# Patient Record
Sex: FEMALE | Race: WHITE | NOT HISPANIC OR LATINO
[De-identification: names, ages, dates, MRNs, and addresses within clinical notes are randomized per-mention and may not be internally consistent; named-entity substitution may affect disease eponyms.]

---

## 2017-08-03 ENCOUNTER — APPOINTMENT (OUTPATIENT)
Dept: INTERNAL MEDICINE | Facility: CLINIC | Age: 27
End: 2017-08-03
Payer: COMMERCIAL

## 2017-08-03 ENCOUNTER — LABORATORY RESULT (OUTPATIENT)
Age: 27
End: 2017-08-03

## 2017-08-03 VITALS
TEMPERATURE: 98.6 F | DIASTOLIC BLOOD PRESSURE: 70 MMHG | HEART RATE: 85 BPM | WEIGHT: 187 LBS | OXYGEN SATURATION: 98 % | HEIGHT: 67 IN | SYSTOLIC BLOOD PRESSURE: 100 MMHG | BODY MASS INDEX: 29.35 KG/M2

## 2017-08-03 DIAGNOSIS — Z87.891 PERSONAL HISTORY OF NICOTINE DEPENDENCE: ICD-10-CM

## 2017-08-03 DIAGNOSIS — Z86.19 PERSONAL HISTORY OF OTHER INFECTIOUS AND PARASITIC DISEASES: ICD-10-CM

## 2017-08-03 DIAGNOSIS — Z80.3 FAMILY HISTORY OF MALIGNANT NEOPLASM OF BREAST: ICD-10-CM

## 2017-08-03 DIAGNOSIS — N30.00 ACUTE CYSTITIS W/OUT HEMATURIA: ICD-10-CM

## 2017-08-03 DIAGNOSIS — Z82.49 FAMILY HISTORY OF ISCHEMIC HEART DISEASE AND OTHER DISEASES OF THE CIRCULATORY SYSTEM: ICD-10-CM

## 2017-08-03 DIAGNOSIS — R22.9 LOCALIZED SWELLING, MASS AND LUMP, UNSPECIFIED: ICD-10-CM

## 2017-08-03 DIAGNOSIS — Z80.0 FAMILY HISTORY OF MALIGNANT NEOPLASM OF DIGESTIVE ORGANS: ICD-10-CM

## 2017-08-03 PROCEDURE — 90471 IMMUNIZATION ADMIN: CPT

## 2017-08-03 PROCEDURE — 90715 TDAP VACCINE 7 YRS/> IM: CPT

## 2017-08-03 PROCEDURE — 36415 COLL VENOUS BLD VENIPUNCTURE: CPT

## 2017-08-03 PROCEDURE — 99385 PREV VISIT NEW AGE 18-39: CPT | Mod: 25

## 2017-08-04 ENCOUNTER — TRANSCRIPTION ENCOUNTER (OUTPATIENT)
Age: 27
End: 2017-08-04

## 2017-09-08 ENCOUNTER — TRANSCRIPTION ENCOUNTER (OUTPATIENT)
Age: 27
End: 2017-09-08

## 2017-09-08 LAB
25(OH)D3 SERPL-MCNC: 34.4 NG/ML
ALBUMIN SERPL ELPH-MCNC: 4.3 G/DL
ALP BLD-CCNC: 64 U/L
ALT SERPL-CCNC: 19 U/L
ANION GAP SERPL CALC-SCNC: 13 MMOL/L
APPEARANCE: CLEAR
AST SERPL-CCNC: 25 U/L
BASOPHILS # BLD AUTO: 0.02 K/UL
BASOPHILS NFR BLD AUTO: 0.3 %
BILIRUB SERPL-MCNC: 0.5 MG/DL
BILIRUBIN URINE: NEGATIVE
BLOOD URINE: NEGATIVE
BUN SERPL-MCNC: 10 MG/DL
CALCIUM SERPL-MCNC: 9.5 MG/DL
CHLORIDE SERPL-SCNC: 100 MMOL/L
CHOLEST SERPL-MCNC: 160 MG/DL
CHOLEST/HDLC SERPL: 2.3 RATIO
CO2 SERPL-SCNC: 24 MMOL/L
COLOR: ABNORMAL
CREAT SERPL-MCNC: 0.71 MG/DL
EOSINOPHIL # BLD AUTO: 0.2 K/UL
EOSINOPHIL NFR BLD AUTO: 2.7 %
GLUCOSE QUALITATIVE U: NORMAL MG/DL
GLUCOSE SERPL-MCNC: 77 MG/DL
HBA1C MFR BLD HPLC: 4.9 %
HCT VFR BLD CALC: 39.2 %
HDLC SERPL-MCNC: 70 MG/DL
HGB BLD-MCNC: 12.7 G/DL
IMM GRANULOCYTES NFR BLD AUTO: 0.1 %
KETONES URINE: ABNORMAL
LDLC SERPL CALC-MCNC: 72 MG/DL
LEUKOCYTE ESTERASE URINE: NEGATIVE
LYMPHOCYTES # BLD AUTO: 2.27 K/UL
LYMPHOCYTES NFR BLD AUTO: 30.4 %
MAN DIFF?: NORMAL
MCHC RBC-ENTMCNC: 29.7 PG
MCHC RBC-ENTMCNC: 32.4 GM/DL
MCV RBC AUTO: 91.6 FL
MONOCYTES # BLD AUTO: 0.46 K/UL
MONOCYTES NFR BLD AUTO: 6.2 %
NEUTROPHILS # BLD AUTO: 4.5 K/UL
NEUTROPHILS NFR BLD AUTO: 60.3 %
NITRITE URINE: NEGATIVE
PH URINE: 7
PLATELET # BLD AUTO: 286 K/UL
POTASSIUM SERPL-SCNC: 4.6 MMOL/L
PROT SERPL-MCNC: 7.8 G/DL
PROTEIN URINE: NEGATIVE MG/DL
RBC # BLD: 4.28 M/UL
RBC # FLD: 13.8 %
SODIUM SERPL-SCNC: 137 MMOL/L
SPECIFIC GRAVITY URINE: 1.03
TRIGL SERPL-MCNC: 88 MG/DL
TSH SERPL-ACNC: 1.46 UIU/ML
UROBILINOGEN URINE: NORMAL MG/DL
WBC # FLD AUTO: 7.46 K/UL

## 2017-09-11 ENCOUNTER — FORM ENCOUNTER (OUTPATIENT)
Age: 27
End: 2017-09-11

## 2017-09-12 ENCOUNTER — APPOINTMENT (OUTPATIENT)
Dept: ULTRASOUND IMAGING | Facility: CLINIC | Age: 27
End: 2017-09-12
Payer: COMMERCIAL

## 2017-09-12 ENCOUNTER — OUTPATIENT (OUTPATIENT)
Dept: OUTPATIENT SERVICES | Facility: HOSPITAL | Age: 27
LOS: 1 days | End: 2017-09-12

## 2017-09-12 PROCEDURE — 76882 US LMTD JT/FCL EVL NVASC XTR: CPT | Mod: 26,LT

## 2017-10-10 ENCOUNTER — TRANSCRIPTION ENCOUNTER (OUTPATIENT)
Age: 27
End: 2017-10-10

## 2017-10-11 ENCOUNTER — TRANSCRIPTION ENCOUNTER (OUTPATIENT)
Age: 27
End: 2017-10-11

## 2017-10-23 ENCOUNTER — APPOINTMENT (OUTPATIENT)
Dept: INTERNAL MEDICINE | Facility: CLINIC | Age: 27
End: 2017-10-23
Payer: COMMERCIAL

## 2017-10-23 VITALS
DIASTOLIC BLOOD PRESSURE: 70 MMHG | HEIGHT: 67 IN | BODY MASS INDEX: 29.51 KG/M2 | HEART RATE: 90 BPM | WEIGHT: 188 LBS | SYSTOLIC BLOOD PRESSURE: 110 MMHG | OXYGEN SATURATION: 99 % | TEMPERATURE: 98.5 F

## 2017-10-23 DIAGNOSIS — Z87.09 PERSONAL HISTORY OF OTHER DISEASES OF THE RESPIRATORY SYSTEM: ICD-10-CM

## 2017-10-23 PROCEDURE — 87880 STREP A ASSAY W/OPTIC: CPT | Mod: QW

## 2017-10-23 PROCEDURE — 99213 OFFICE O/P EST LOW 20 MIN: CPT | Mod: 25

## 2017-10-23 RX ORDER — CIPROFLOXACIN HYDROCHLORIDE 500 MG/1
500 TABLET, FILM COATED ORAL
Qty: 14 | Refills: 0 | Status: COMPLETED | COMMUNITY
Start: 2017-06-26

## 2017-10-25 LAB — S PYO AG SPEC QL IA: NEGATIVE

## 2017-12-15 ENCOUNTER — APPOINTMENT (OUTPATIENT)
Dept: ULTRASOUND IMAGING | Facility: CLINIC | Age: 27
End: 2017-12-15

## 2018-01-02 ENCOUNTER — OTHER (OUTPATIENT)
Age: 28
End: 2018-01-02

## 2018-04-04 ENCOUNTER — FORM ENCOUNTER (OUTPATIENT)
Age: 28
End: 2018-04-04

## 2018-04-05 ENCOUNTER — OUTPATIENT (OUTPATIENT)
Dept: OUTPATIENT SERVICES | Facility: HOSPITAL | Age: 28
LOS: 1 days | End: 2018-04-05
Payer: COMMERCIAL

## 2018-04-05 ENCOUNTER — APPOINTMENT (OUTPATIENT)
Dept: INTERNAL MEDICINE | Facility: CLINIC | Age: 28
End: 2018-04-05
Payer: COMMERCIAL

## 2018-04-05 ENCOUNTER — APPOINTMENT (OUTPATIENT)
Dept: RADIOLOGY | Facility: CLINIC | Age: 28
End: 2018-04-05

## 2018-04-05 VITALS
HEART RATE: 64 BPM | BODY MASS INDEX: 29.51 KG/M2 | OXYGEN SATURATION: 99 % | WEIGHT: 188 LBS | TEMPERATURE: 98.2 F | HEIGHT: 67 IN | SYSTOLIC BLOOD PRESSURE: 118 MMHG | DIASTOLIC BLOOD PRESSURE: 70 MMHG

## 2018-04-05 DIAGNOSIS — M25.532 PAIN IN LEFT WRIST: ICD-10-CM

## 2018-04-05 DIAGNOSIS — H57.8 OTHER SPECIFIED DISORDERS OF EYE AND ADNEXA: ICD-10-CM

## 2018-04-05 PROCEDURE — 73110 X-RAY EXAM OF WRIST: CPT | Mod: 26,LT

## 2018-04-05 PROCEDURE — 99213 OFFICE O/P EST LOW 20 MIN: CPT

## 2018-04-05 RX ORDER — OSELTAMIVIR PHOSPHATE 75 MG/1
75 CAPSULE ORAL
Qty: 10 | Refills: 0 | Status: COMPLETED | COMMUNITY
Start: 2018-01-10 | End: 2018-04-05

## 2018-04-05 RX ORDER — LIDOCAINE HYDROCHLORIDE 20 MG/ML
2 SOLUTION OROPHARYNGEAL 4 TIMES DAILY
Qty: 1 | Refills: 0 | Status: COMPLETED | COMMUNITY
Start: 2017-10-23 | End: 2018-04-05

## 2018-06-11 ENCOUNTER — APPOINTMENT (OUTPATIENT)
Dept: OPHTHALMOLOGY | Facility: CLINIC | Age: 28
End: 2018-06-11
Payer: COMMERCIAL

## 2018-06-11 DIAGNOSIS — G43.109 MIGRAINE WITH AURA, NOT INTRACTABLE, W/OUT STATUS MIGRAINOSUS: ICD-10-CM

## 2018-06-11 DIAGNOSIS — H04.123 DRY EYE SYNDROME OF BILATERAL LACRIMAL GLANDS: ICD-10-CM

## 2018-06-11 PROCEDURE — 92002 INTRM OPH EXAM NEW PATIENT: CPT

## 2018-08-14 ENCOUNTER — APPOINTMENT (OUTPATIENT)
Dept: INTERNAL MEDICINE | Facility: CLINIC | Age: 28
End: 2018-08-14

## 2018-10-26 ENCOUNTER — APPOINTMENT (OUTPATIENT)
Dept: INTERNAL MEDICINE | Facility: CLINIC | Age: 28
End: 2018-10-26
Payer: COMMERCIAL

## 2018-10-26 ENCOUNTER — LABORATORY RESULT (OUTPATIENT)
Age: 28
End: 2018-10-26

## 2018-10-26 VITALS
HEART RATE: 53 BPM | OXYGEN SATURATION: 96 % | SYSTOLIC BLOOD PRESSURE: 119 MMHG | TEMPERATURE: 98.7 F | DIASTOLIC BLOOD PRESSURE: 85 MMHG | HEIGHT: 67 IN

## 2018-10-26 DIAGNOSIS — T78.1XXA OTHER ADVERSE FOOD REACTIONS, NOT ELSEWHERE CLASSIFIED, INITIAL ENCOUNTER: ICD-10-CM

## 2018-10-26 DIAGNOSIS — Z83.79 FAMILY HISTORY OF OTHER DISEASES OF THE DIGESTIVE SYSTEM: ICD-10-CM

## 2018-10-26 PROCEDURE — 99214 OFFICE O/P EST MOD 30 MIN: CPT | Mod: 25

## 2018-10-26 PROCEDURE — 36415 COLL VENOUS BLD VENIPUNCTURE: CPT

## 2018-10-26 PROCEDURE — 90686 IIV4 VACC NO PRSV 0.5 ML IM: CPT

## 2018-10-26 PROCEDURE — G0008: CPT

## 2018-11-04 LAB
BARLEY IGE QN: <0.1 KUA/L
CHERRY IGE QN: <0.1 KUA/L
COW MILK IGE QN: <0.1 KUA/L
CRAB IGE QN: <0.1 KUA/L
DEPRECATED BARLEY IGE RAST QL: 0
DEPRECATED CHERRY IGE RAST QL: 0
DEPRECATED COW MILK IGE RAST QL: 0
DEPRECATED CRAB IGE RAST QL: 0
DEPRECATED EGG WHITE IGE RAST QL: 0
DEPRECATED OAT IGE RAST QL: 0
DEPRECATED PEANUT IGE RAST QL: 0
DEPRECATED RYE IGE RAST QL: 0
DEPRECATED SOYBEAN IGE RAST QL: 0
DEPRECATED WHEAT IGE RAST QL: 0
EGG WHITE IGE QN: <0.1 KUA/L
ENDOMYSIUM IGA SER QL: NEGATIVE
ENDOMYSIUM IGA TITR SER: NORMAL
GLIADIN IGA SER QL: <5 UNITS
GLIADIN IGG SER QL: <5 UNITS
GLIADIN PEPTIDE IGA SER-ACNC: NEGATIVE
GLIADIN PEPTIDE IGG SER-ACNC: NEGATIVE
OAT IGE QN: <0.1 KUA/L
PEANUT IGE QN: <0.1 KUA/L
RYE IGE QN: <0.1 KUA/L
SOYBEAN IGE QN: <0.1 KUA/L
TOTAL IGE SMQN RAST: 13 KU/L
TTG IGA SER IA-ACNC: <5 UNITS
TTG IGA SER-ACNC: NEGATIVE
TTG IGG SER IA-ACNC: <5 UNITS
TTG IGG SER IA-ACNC: NEGATIVE
WHEAT IGE QN: <0.1 KUA/L

## 2019-01-03 ENCOUNTER — TRANSCRIPTION ENCOUNTER (OUTPATIENT)
Age: 29
End: 2019-01-03

## 2019-01-22 ENCOUNTER — APPOINTMENT (OUTPATIENT)
Dept: OBGYN | Facility: CLINIC | Age: 29
End: 2019-01-22
Payer: COMMERCIAL

## 2019-01-22 VITALS
BODY MASS INDEX: 30.62 KG/M2 | WEIGHT: 202 LBS | DIASTOLIC BLOOD PRESSURE: 80 MMHG | HEIGHT: 68 IN | SYSTOLIC BLOOD PRESSURE: 110 MMHG

## 2019-01-22 DIAGNOSIS — Z82.49 FAMILY HISTORY OF ISCHEMIC HEART DISEASE AND OTHER DISEASES OF THE CIRCULATORY SYSTEM: ICD-10-CM

## 2019-01-22 PROCEDURE — 99385 PREV VISIT NEW AGE 18-39: CPT

## 2019-01-22 NOTE — HISTORY OF PRESENT ILLNESS
[Definite:  ___ (Date)] : the last menstrual period was [unfilled] [Regular Cycle Intervals] : periods have been regular [Sexually Active] : is sexually active [Male ___] : [unfilled] male [Yes] : yes [Last Colonoscopy ___] : Last colonoscopy [unfilled] [Last Pap ___] : Last cervical pap smear was [unfilled] [Reproductive Age] : is of reproductive age [Menstrual Problems] : reports normal menses [Contraception] : uses contraception [Oral Contraceptive] : uses oral contraception pills [Menarche Age: ____] : age at menarche was [unfilled]

## 2019-01-24 LAB — HPV HIGH+LOW RISK DNA PNL CVX: NOT DETECTED

## 2019-01-28 LAB — CYTOLOGY CVX/VAG DOC THIN PREP: NORMAL

## 2019-02-20 ENCOUNTER — TRANSCRIPTION ENCOUNTER (OUTPATIENT)
Age: 29
End: 2019-02-20

## 2019-02-26 ENCOUNTER — TRANSCRIPTION ENCOUNTER (OUTPATIENT)
Age: 29
End: 2019-02-26

## 2019-03-03 ENCOUNTER — TRANSCRIPTION ENCOUNTER (OUTPATIENT)
Age: 29
End: 2019-03-03

## 2019-03-31 ENCOUNTER — TRANSCRIPTION ENCOUNTER (OUTPATIENT)
Age: 29
End: 2019-03-31

## 2019-04-12 ENCOUNTER — TRANSCRIPTION ENCOUNTER (OUTPATIENT)
Age: 29
End: 2019-04-12

## 2019-04-15 ENCOUNTER — APPOINTMENT (OUTPATIENT)
Dept: OBGYN | Facility: CLINIC | Age: 29
End: 2019-04-15
Payer: COMMERCIAL

## 2019-04-15 DIAGNOSIS — Z92.29 PERSONAL HISTORY OF OTHER DRUG THERAPY: ICD-10-CM

## 2019-04-15 DIAGNOSIS — Z23 ENCOUNTER FOR IMMUNIZATION: ICD-10-CM

## 2019-04-15 DIAGNOSIS — Z01.419 ENCOUNTER FOR GYNECOLOGICAL EXAMINATION (GENERAL) (ROUTINE) W/OUT ABNORMAL FINDINGS: ICD-10-CM

## 2019-04-15 PROCEDURE — 99213 OFFICE O/P EST LOW 20 MIN: CPT

## 2019-04-15 NOTE — HISTORY OF PRESENT ILLNESS
[Definite:  ___ (Date)] : the last menstrual period was [unfilled] [Frequency: Q ___ days] : menstrual periods occur approximately every [unfilled] days [Lower-Rt-Q] : lower right quadrant [Continuous] : continuous [Regular Cycle Intervals] : periods have been irregular

## 2019-04-15 NOTE — CHIEF COMPLAINT
[Follow Up] : follow up GYN visit [FreeTextEntry1] : Patient is here for a follow up visit in reference to lower abdominal pain on right side which started back in January. Patient also stated experiencing numbness in right leg.

## 2019-04-15 NOTE — PHYSICAL EXAM
[No Bleeding] : there was no active vaginal bleeding [Normal] : uterus [Tenderness] : nontender [Uterine Adnexae] : were not tender and not enlarged [FreeTextEntry7] : Pelvic tenderness, bilateral, but more on the right.

## 2019-04-22 ENCOUNTER — APPOINTMENT (OUTPATIENT)
Dept: ANTEPARTUM | Facility: CLINIC | Age: 29
End: 2019-04-22
Payer: COMMERCIAL

## 2019-04-22 PROCEDURE — 76830 TRANSVAGINAL US NON-OB: CPT

## 2019-04-22 PROCEDURE — 76856 US EXAM PELVIC COMPLETE: CPT

## 2019-05-20 ENCOUNTER — APPOINTMENT (OUTPATIENT)
Dept: OBGYN | Facility: CLINIC | Age: 29
End: 2019-05-20

## 2019-06-11 ENCOUNTER — APPOINTMENT (OUTPATIENT)
Dept: INTERNAL MEDICINE | Facility: CLINIC | Age: 29
End: 2019-06-11
Payer: COMMERCIAL

## 2019-06-11 VITALS
DIASTOLIC BLOOD PRESSURE: 84 MMHG | TEMPERATURE: 98.2 F | HEIGHT: 68 IN | WEIGHT: 202 LBS | BODY MASS INDEX: 30.62 KG/M2 | HEART RATE: 55 BPM | SYSTOLIC BLOOD PRESSURE: 121 MMHG | OXYGEN SATURATION: 99 %

## 2019-06-11 DIAGNOSIS — B00.1 HERPESVIRAL VESICULAR DERMATITIS: ICD-10-CM

## 2019-06-11 DIAGNOSIS — Z11.59 ENCOUNTER FOR SCREENING FOR OTHER VIRAL DISEASES: ICD-10-CM

## 2019-06-11 PROCEDURE — 99395 PREV VISIT EST AGE 18-39: CPT | Mod: 25

## 2019-06-11 PROCEDURE — 36415 COLL VENOUS BLD VENIPUNCTURE: CPT

## 2019-06-11 RX ORDER — AZELASTINE HYDROCHLORIDE 0.5 MG/ML
0.05 SOLUTION/ DROPS OPHTHALMIC TWICE DAILY
Qty: 1 | Refills: 0 | Status: COMPLETED | COMMUNITY
Start: 2018-04-05 | End: 2019-06-11

## 2019-06-13 NOTE — HEALTH RISK ASSESSMENT
[0] : 1) Little interest or pleasure doing things: Not at all (0) [FreeTextEntry1] : anxiety [[id number]] : 0

## 2019-06-13 NOTE — PHYSICAL EXAM
[No Acute Distress] : no acute distress [Well Nourished] : well nourished [Normal Sclera/Conjunctiva] : normal sclera/conjunctiva [No JVD] : no jugular venous distention [Supple] : supple [No Respiratory Distress] : no respiratory distress  [No Lymphadenopathy] : no lymphadenopathy [Clear to Auscultation] : lungs were clear to auscultation bilaterally [No Accessory Muscle Use] : no accessory muscle use [Normal Rate] : normal rate  [Normal S1, S2] : normal S1 and S2 [Regular Rhythm] : with a regular rhythm [No Murmur] : no murmur heard [No Edema] : there was no peripheral edema [Soft] : abdomen soft [No Extremity Clubbing/Cyanosis] : no extremity clubbing/cyanosis [Non-distended] : non-distended [Non Tender] : non-tender [No HSM] : no HSM [No Masses] : no abdominal mass palpated [Normal Posterior Cervical Nodes] : no posterior cervical lymphadenopathy [Normal Bowel Sounds] : normal bowel sounds [No CVA Tenderness] : no CVA  tenderness [Normal Anterior Cervical Nodes] : no anterior cervical lymphadenopathy [No Spinal Tenderness] : no spinal tenderness [No Joint Swelling] : no joint swelling [Grossly Normal Strength/Tone] : grossly normal strength/tone [No Rash] : no rash [Coordination Grossly Intact] : coordination grossly intact [Normal Gait] : normal gait [Normal Affect] : the affect was normal [No Focal Deficits] : no focal deficits [Alert and Oriented x3] : oriented to person, place, and time [Normal Insight/Judgement] : insight and judgment were intact

## 2019-07-23 LAB
25(OH)D3 SERPL-MCNC: 21.5 NG/ML
ALBUMIN SERPL ELPH-MCNC: 4.6 G/DL
ALP BLD-CCNC: 76 U/L
ALT SERPL-CCNC: 11 U/L
ANION GAP SERPL CALC-SCNC: 12 MMOL/L
AST SERPL-CCNC: 16 U/L
BASOPHILS # BLD AUTO: 0.04 K/UL
BASOPHILS NFR BLD AUTO: 0.7 %
BILIRUB SERPL-MCNC: 0.4 MG/DL
BUN SERPL-MCNC: 13 MG/DL
CALCIUM SERPL-MCNC: 9.5 MG/DL
CHLORIDE SERPL-SCNC: 101 MMOL/L
CHOLEST SERPL-MCNC: 164 MG/DL
CHOLEST/HDLC SERPL: 2.2 RATIO
CO2 SERPL-SCNC: 24 MMOL/L
CREAT SERPL-MCNC: 0.72 MG/DL
EOSINOPHIL # BLD AUTO: 0.2 K/UL
EOSINOPHIL NFR BLD AUTO: 3.3 %
ESTIMATED AVERAGE GLUCOSE: 91 MG/DL
GLUCOSE SERPL-MCNC: 90 MG/DL
HBA1C MFR BLD HPLC: 4.8 %
HCT VFR BLD CALC: 41.4 %
HDLC SERPL-MCNC: 75 MG/DL
HGB BLD-MCNC: 13.4 G/DL
HSV 1+2 IGG SER IA-IMP: NEGATIVE
HSV 1+2 IGG SER IA-IMP: POSITIVE
HSV1 IGG SER QL: 2.6 INDEX
HSV2 IGG SER QL: 0.12 INDEX
IMM GRANULOCYTES NFR BLD AUTO: 0.2 %
LDLC SERPL CALC-MCNC: 69 MG/DL
LYMPHOCYTES # BLD AUTO: 1.55 K/UL
LYMPHOCYTES NFR BLD AUTO: 25.3 %
MAN DIFF?: NORMAL
MCHC RBC-ENTMCNC: 30.7 PG
MCHC RBC-ENTMCNC: 32.4 GM/DL
MCV RBC AUTO: 95 FL
MEV IGG FLD QL IA: 176 AU/ML
MEV IGG+IGM SER-IMP: POSITIVE
MONOCYTES # BLD AUTO: 0.42 K/UL
MONOCYTES NFR BLD AUTO: 6.9 %
MUV AB SER-ACNC: POSITIVE
MUV IGG SER QL IA: 71.8 AU/ML
NEUTROPHILS # BLD AUTO: 3.91 K/UL
NEUTROPHILS NFR BLD AUTO: 63.6 %
PLATELET # BLD AUTO: 289 K/UL
POTASSIUM SERPL-SCNC: 4 MMOL/L
PROT SERPL-MCNC: 7.4 G/DL
RBC # BLD: 4.36 M/UL
RBC # FLD: 13.2 %
RUBV IGG FLD-ACNC: 1.6 INDEX
RUBV IGG SER-IMP: POSITIVE
SODIUM SERPL-SCNC: 137 MMOL/L
TRIGL SERPL-MCNC: 101 MG/DL
TSH SERPL-ACNC: 2.75 UIU/ML
VZV AB TITR SER: POSITIVE
VZV IGG SER IF-ACNC: 1765 INDEX
WBC # FLD AUTO: 6.13 K/UL

## 2019-09-10 ENCOUNTER — MEDICATION RENEWAL (OUTPATIENT)
Age: 29
End: 2019-09-10

## 2019-09-19 ENCOUNTER — TRANSCRIPTION ENCOUNTER (OUTPATIENT)
Age: 29
End: 2019-09-19

## 2019-09-23 ENCOUNTER — APPOINTMENT (OUTPATIENT)
Dept: INTERNAL MEDICINE | Facility: CLINIC | Age: 29
End: 2019-09-23
Payer: COMMERCIAL

## 2019-09-23 VITALS
TEMPERATURE: 98.2 F | SYSTOLIC BLOOD PRESSURE: 124 MMHG | HEART RATE: 63 BPM | HEIGHT: 68 IN | DIASTOLIC BLOOD PRESSURE: 84 MMHG | OXYGEN SATURATION: 97 %

## 2019-09-23 PROCEDURE — 99214 OFFICE O/P EST MOD 30 MIN: CPT

## 2019-09-25 NOTE — HISTORY OF PRESENT ILLNESS
[FreeTextEntry8] : Last 6 months: worsening heartburn.  Sometimes feels like bites of food are getting stuck even though not bigger than usual.  Sometimes feels like food stuck, only relief is if regurgitates food, once a month for last 5 months.  Taking Tums or Rolaid 2-3x/week.  When younger (10 yrs ago) had purging as part of eating disorder.  Knows triggers, tries to avoid, but diet changed because stopped being vegetarian in the last 6 months.  \par \par Anxiety better with lexapro, since that is better she is now able to tell that this is a different sensation that she had previously attributed to anxiety.

## 2019-09-25 NOTE — REVIEW OF SYSTEMS
[Chest Pain] : chest pain [Heartburn] : heartburn [Negative] : ENT [FreeTextEntry7] : sometimes feels like food getting caught

## 2019-11-12 ENCOUNTER — TRANSCRIPTION ENCOUNTER (OUTPATIENT)
Age: 29
End: 2019-11-12

## 2019-11-25 ENCOUNTER — MEDICATION RENEWAL (OUTPATIENT)
Age: 29
End: 2019-11-25

## 2019-11-25 ENCOUNTER — TRANSCRIPTION ENCOUNTER (OUTPATIENT)
Age: 29
End: 2019-11-25

## 2019-12-10 ENCOUNTER — TRANSCRIPTION ENCOUNTER (OUTPATIENT)
Age: 29
End: 2019-12-10

## 2019-12-27 ENCOUNTER — TRANSCRIPTION ENCOUNTER (OUTPATIENT)
Age: 29
End: 2019-12-27

## 2019-12-27 ENCOUNTER — APPOINTMENT (OUTPATIENT)
Dept: GASTROENTEROLOGY | Facility: CLINIC | Age: 29
End: 2019-12-27
Payer: COMMERCIAL

## 2019-12-27 ENCOUNTER — MEDICATION RENEWAL (OUTPATIENT)
Age: 29
End: 2019-12-27

## 2019-12-27 VITALS
SYSTOLIC BLOOD PRESSURE: 127 MMHG | TEMPERATURE: 98.3 F | HEIGHT: 68 IN | HEART RATE: 76 BPM | OXYGEN SATURATION: 96 % | DIASTOLIC BLOOD PRESSURE: 85 MMHG

## 2019-12-27 PROCEDURE — 99204 OFFICE O/P NEW MOD 45 MIN: CPT

## 2019-12-30 ENCOUNTER — APPOINTMENT (OUTPATIENT)
Dept: GASTROENTEROLOGY | Facility: CLINIC | Age: 29
End: 2019-12-30

## 2020-01-14 ENCOUNTER — TRANSCRIPTION ENCOUNTER (OUTPATIENT)
Age: 30
End: 2020-01-14

## 2020-01-15 ENCOUNTER — TRANSCRIPTION ENCOUNTER (OUTPATIENT)
Age: 30
End: 2020-01-15

## 2020-01-23 ENCOUNTER — APPOINTMENT (OUTPATIENT)
Dept: DERMATOLOGY | Facility: CLINIC | Age: 30
End: 2020-01-23

## 2020-01-31 ENCOUNTER — OUTPATIENT (OUTPATIENT)
Dept: OUTPATIENT SERVICES | Facility: HOSPITAL | Age: 30
LOS: 1 days | Discharge: HOME | End: 2020-01-31
Payer: COMMERCIAL

## 2020-01-31 ENCOUNTER — APPOINTMENT (OUTPATIENT)
Dept: GASTROENTEROLOGY | Facility: HOSPITAL | Age: 30
End: 2020-01-31

## 2020-01-31 ENCOUNTER — RESULT REVIEW (OUTPATIENT)
Age: 30
End: 2020-01-31

## 2020-01-31 PROCEDURE — 88305 TISSUE EXAM BY PATHOLOGIST: CPT | Mod: 26

## 2020-01-31 PROCEDURE — 88342 IMHCHEM/IMCYTCHM 1ST ANTB: CPT

## 2020-01-31 PROCEDURE — 88341 IMHCHEM/IMCYTCHM EA ADD ANTB: CPT | Mod: 26

## 2020-01-31 PROCEDURE — 88341 IMHCHEM/IMCYTCHM EA ADD ANTB: CPT

## 2020-01-31 PROCEDURE — 88342 IMHCHEM/IMCYTCHM 1ST ANTB: CPT | Mod: 26

## 2020-01-31 PROCEDURE — 43239 EGD BIOPSY SINGLE/MULTIPLE: CPT

## 2020-01-31 PROCEDURE — 88305 TISSUE EXAM BY PATHOLOGIST: CPT

## 2020-02-04 LAB — SURGICAL PATHOLOGY STUDY: SIGNIFICANT CHANGE UP

## 2020-02-11 ENCOUNTER — TRANSCRIPTION ENCOUNTER (OUTPATIENT)
Age: 30
End: 2020-02-11

## 2020-02-25 ENCOUNTER — TRANSCRIPTION ENCOUNTER (OUTPATIENT)
Age: 30
End: 2020-02-25

## 2020-03-05 ENCOUNTER — TRANSCRIPTION ENCOUNTER (OUTPATIENT)
Age: 30
End: 2020-03-05

## 2020-03-18 ENCOUNTER — TRANSCRIPTION ENCOUNTER (OUTPATIENT)
Age: 30
End: 2020-03-18

## 2020-03-19 ENCOUNTER — TRANSCRIPTION ENCOUNTER (OUTPATIENT)
Age: 30
End: 2020-03-19

## 2020-04-02 ENCOUNTER — TRANSCRIPTION ENCOUNTER (OUTPATIENT)
Age: 30
End: 2020-04-02

## 2020-04-03 ENCOUNTER — TRANSCRIPTION ENCOUNTER (OUTPATIENT)
Age: 30
End: 2020-04-03

## 2020-04-06 ENCOUNTER — TRANSCRIPTION ENCOUNTER (OUTPATIENT)
Age: 30
End: 2020-04-06

## 2020-04-07 ENCOUNTER — TRANSCRIPTION ENCOUNTER (OUTPATIENT)
Age: 30
End: 2020-04-07

## 2020-05-26 ENCOUNTER — APPOINTMENT (OUTPATIENT)
Dept: INTERNAL MEDICINE | Facility: CLINIC | Age: 30
End: 2020-05-26
Payer: COMMERCIAL

## 2020-05-26 DIAGNOSIS — F41.1 GENERALIZED ANXIETY DISORDER: ICD-10-CM

## 2020-05-26 PROCEDURE — 99213 OFFICE O/P EST LOW 20 MIN: CPT | Mod: 95

## 2020-06-08 ENCOUNTER — TRANSCRIPTION ENCOUNTER (OUTPATIENT)
Age: 30
End: 2020-06-08

## 2020-06-08 DIAGNOSIS — Z81.8 FAMILY HISTORY OF OTHER MENTAL AND BEHAVIORAL DISORDERS: ICD-10-CM

## 2020-06-30 ENCOUNTER — TRANSCRIPTION ENCOUNTER (OUTPATIENT)
Age: 30
End: 2020-06-30

## 2020-08-03 ENCOUNTER — APPOINTMENT (OUTPATIENT)
Dept: INTERNAL MEDICINE | Facility: CLINIC | Age: 30
End: 2020-08-03
Payer: COMMERCIAL

## 2020-08-03 PROCEDURE — 99213 OFFICE O/P EST LOW 20 MIN: CPT | Mod: 95

## 2020-08-05 NOTE — HISTORY OF PRESENT ILLNESS
[Home] : at home, [unfilled] , at the time of the visit. [Verbal consent obtained from patient] : the patient, [unfilled] [Medical Office: ([facility])___] : at the medical office located in  [de-identified] : Wants to titrate off lexapro\par \par Always had urinary frequency, but recently waking up at night and having to urinate, feels like she's having to work harder to start urinary stream. Also R sided rib discomfort. Doesn't feel like UTI

## 2020-08-21 ENCOUNTER — TRANSCRIPTION ENCOUNTER (OUTPATIENT)
Age: 30
End: 2020-08-21

## 2020-08-31 DIAGNOSIS — N39.0 URINARY TRACT INFECTION, SITE NOT SPECIFIED: ICD-10-CM

## 2020-08-31 DIAGNOSIS — A49.9 URINARY TRACT INFECTION, SITE NOT SPECIFIED: ICD-10-CM

## 2020-09-02 ENCOUNTER — APPOINTMENT (OUTPATIENT)
Dept: UROGYNECOLOGY | Facility: CLINIC | Age: 30
End: 2020-09-02
Payer: COMMERCIAL

## 2020-09-02 VITALS
TEMPERATURE: 95.1 F | DIASTOLIC BLOOD PRESSURE: 80 MMHG | WEIGHT: 220 LBS | BODY MASS INDEX: 33.34 KG/M2 | HEIGHT: 68 IN | SYSTOLIC BLOOD PRESSURE: 120 MMHG

## 2020-09-02 DIAGNOSIS — R10.2 PELVIC AND PERINEAL PAIN: ICD-10-CM

## 2020-09-02 DIAGNOSIS — F32.9 MAJOR DEPRESSIVE DISORDER, SINGLE EPISODE, UNSPECIFIED: ICD-10-CM

## 2020-09-02 DIAGNOSIS — N39.8 OTHER SPECIFIED DISORDERS OF URINARY SYSTEM: ICD-10-CM

## 2020-09-02 PROCEDURE — 99204 OFFICE O/P NEW MOD 45 MIN: CPT

## 2020-09-02 PROCEDURE — 51798 US URINE CAPACITY MEASURE: CPT

## 2020-09-02 NOTE — HISTORY OF PRESENT ILLNESS
[FreeTextEntry1] : The pt is a 31 y/o P0 with\par 1) suprapubic pain \par 2)feeling of incomplete emptying for 6 months\par She feels that her stream is slow and often feels hesitancy in voiding\par 3) Right flank pain for 2 months\par \par She denies UI.\par She voids every 1-2 hrs with a varied  volume and has nocturia 2-6x with a medium volume\par Her liquid intake consists of 6 glasses of water, 1 can of seltzer, 1 cup of coffee /day.\par She has a BM q day .\par She denies gross hematuria, recurrent UTIs, hx of nephrolithiaisis or vaginal bulge.\par Her last PAP was 2018 , and she has had an abnormal PAP in 2013, underwent Cryotx.  Nl PAP since then. \par She is currently taking OCP for contraception .\par The last time she had intercourse was 1 wk ago . \par She denies having had abdominal surgery\par Her hx is sig for depression is on Wellbuterine.\par She is a \par \par

## 2020-09-02 NOTE — PHYSICAL EXAM
[No Acute Distress] : in no acute distress [Well developed] : well developed [Well Nourished] : ~L well nourished [Good Hygeine] : demonstrates good hygeine [Normal Memory] : ~T memory was ~L unimpaired [Normal Mood/Affect] : mood and affect are normal [Oriented x3] : oriented to person, place, and time [Respirations regular] : ~T respiratory rate was regular [Normal Lung Sounds] : the lungs were clear to auscultation [Rate & Rhythm Regular] : ~T heart rate and rhythm were normal [No Edema] : ~T edema was not present [Supple] : ~T the neck demonstrated no ~M decrease in suppleness [Thyroid Normal] : the thyroid ~T showed no abnormalities [Symmetrical] : the neck was ~L symmetrical [Labia Majora] : were normal [Normal Gait] : gait was normal [Labia Minora] : were normal [Bartholin's Gland] : both Bartholin's glands were normal  [Pink Rugae] : pink rugae [Normal Appearance] : general appearance was normal [Estrogen Effect] : estrogen effect was observed [No Bleeding] : there was no active vaginal bleeding [Exam Deferred] : was deferred [Normal] : no abnormalities [Anxiety] : patient is not anxious [Cough] : no cough [Dyspnea] : no dyspnea [Murmurs] : no murmurs were heard [Varicose Veins] : no varicose veins observed [Tracheal Deviation] : no tracheal deviation observed [Mass] : no ~M [unfilled] neck mass was observed [Mass (___ Cm)] : no ~M [unfilled] abdominal mass was palpated [Tenderness] : ~T no ~M abdominal tenderness observed [H/Smegaly] : no hepatosplenomegaly [Hernia] : no hernia observed [Distended] : not distended [Scar] : no scars [Post Void Residual ____ml] : post void residual was [unfilled] ml [1] : 1 [de-identified] : no sig prolapse

## 2020-09-02 NOTE — ASSESSMENT
[FreeTextEntry1] : Today's findings were discussed with the pt.  Based on her nl exam and symptoms of LUTS, she will be scheduled for office cystoscopy.  Also, her urine was sent for culture today to rule out an infectious cause of her symptoms..\par \par \par

## 2020-09-04 LAB — BACTERIA UR CULT: NORMAL

## 2020-09-14 ENCOUNTER — APPOINTMENT (OUTPATIENT)
Dept: GASTROENTEROLOGY | Facility: CLINIC | Age: 30
End: 2020-09-14

## 2020-09-15 ENCOUNTER — TRANSCRIPTION ENCOUNTER (OUTPATIENT)
Age: 30
End: 2020-09-15

## 2020-09-15 RX ORDER — ESCITALOPRAM OXALATE 10 MG/1
10 TABLET ORAL DAILY
Qty: 90 | Refills: 1 | Status: COMPLETED | COMMUNITY
Start: 2019-06-11 | End: 2020-09-15

## 2020-09-23 ENCOUNTER — APPOINTMENT (OUTPATIENT)
Dept: UROGYNECOLOGY | Facility: CLINIC | Age: 30
End: 2020-09-23
Payer: COMMERCIAL

## 2020-09-23 PROCEDURE — 52000 CYSTOURETHROSCOPY: CPT

## 2020-09-23 NOTE — REASON FOR VISIT
[facility] now requests orders and shares plan of care/discharge summaries for some patients through [employer].  Please REPLY TO THIS MESSAGE OR ROUTE BACK TO THE AUTHOR in order to give authorization for orders when needed.  This is considered a verbal order, you will still receive a faxed copy of orders for signature.  Thank you for your assistance in improving collaboration for our patients.    ORDER: Requesting to continue PT 2x/month x1 month to provide modification/progression of HEP to improve strength and activity tolerance to facilitate return to out-patient PT.    Thank you,  Jerri Baig, PT  [email or web address]  [phone]    
Per Dr. Karol alexander for below orders.  Sangita Khan MA     
[Initial Visit ___] : an initial visit for [unfilled]

## 2020-10-13 ENCOUNTER — TRANSCRIPTION ENCOUNTER (OUTPATIENT)
Age: 30
End: 2020-10-13

## 2020-11-04 ENCOUNTER — APPOINTMENT (OUTPATIENT)
Dept: INTERNAL MEDICINE | Facility: CLINIC | Age: 30
End: 2020-11-04
Payer: COMMERCIAL

## 2020-11-04 DIAGNOSIS — M79.10 MYALGIA, UNSPECIFIED SITE: ICD-10-CM

## 2020-11-04 DIAGNOSIS — Z23 ENCOUNTER FOR IMMUNIZATION: ICD-10-CM

## 2020-11-04 PROCEDURE — 99215 OFFICE O/P EST HI 40 MIN: CPT | Mod: 95

## 2020-11-10 RX ORDER — ESOMEPRAZOLE MAGNESIUM 40 MG/1
40 CAPSULE, DELAYED RELEASE ORAL DAILY
Qty: 90 | Refills: 0 | Status: DISCONTINUED | COMMUNITY
Start: 2020-11-04 | End: 2020-11-10

## 2020-11-11 ENCOUNTER — APPOINTMENT (OUTPATIENT)
Dept: UROGYNECOLOGY | Facility: CLINIC | Age: 30
End: 2020-11-11

## 2020-12-15 PROBLEM — Z87.09 HISTORY OF SORE THROAT: Status: RESOLVED | Noted: 2017-10-23 | Resolved: 2020-12-15

## 2020-12-23 ENCOUNTER — APPOINTMENT (OUTPATIENT)
Dept: GASTROENTEROLOGY | Facility: CLINIC | Age: 30
End: 2020-12-23
Payer: COMMERCIAL

## 2020-12-23 ENCOUNTER — NON-APPOINTMENT (OUTPATIENT)
Age: 30
End: 2020-12-23

## 2020-12-23 PROBLEM — N39.0 UTI (URINARY TRACT INFECTION), BACTERIAL: Status: RESOLVED | Noted: 2020-08-31 | Resolved: 2020-12-23

## 2020-12-23 PROCEDURE — 99214 OFFICE O/P EST MOD 30 MIN: CPT | Mod: 95

## 2020-12-23 NOTE — ASSESSMENT
[FreeTextEntry1] : 31 yo F with history of anxiety, GERD, previous eating disorder here for follow up\par \par GERD\par partially responsive to PPI\par Recommend lifestyle changes: avoiding alcohol, and waiting 4 hours after dinner to lie down\par Given symptoms of regurgitation, recommend HRM to assess for underlying motility disorder that may be contributing to symptoms\par Will also recommend pH impedance study off PPI to assess amount of acid exposure\par \par Follow up after above tests\par \par Allyn Jhaveri MD\par GI Fellow \par \par 25 min spent on telephonic visit

## 2020-12-23 NOTE — ASSESSMENT
[FreeTextEntry1] : 29 yo F with history of anxiety, GERD, previous eating disorder here for follow up\par \par GERD\par partially responsive to PPI\par Recommend lifestyle changes: avoiding alcohol, and waiting 4 hours after dinner to lie down\par Given symptoms of regurgitation, recommend HRM to assess for underlying motility disorder that may be contributing to symptoms\par Will also recommend pH impedance study off PPI to assess amount of acid exposure\par \par Follow up after above tests\par \par Allyn Jhaveri MD\par GI Fellow

## 2020-12-23 NOTE — HISTORY OF PRESENT ILLNESS
[FreeTextEntry1] : 31 yo F with history of anxiety, GERD, previous eating disorder here for follow up\par \par She saw Dr. Saavedra one year ago for heartburn. She had EGD that showed HH. She was started on PPI, which she has been taking for one year. She feels like it helps 80% of her symptoms but it is not completely resolved. She is also unable to wean off the PPI. She tried stopping it for 1 week but symptoms were so severe\par She has tried lifestyle changes: Avoiding spicy foods, large quantities of food. Although she is overweight, she is not trying to loose weight because she had an eating disorder and this is a trigger for her. \par She also tried famotidine in addition to PPI but didn’t really help\par \par In addition to heartburn, she has regurgitation Often food comes up. If she is in public, she swallows it back down. If she is at home, she spits it out.\par Sometimes nausea, but no vomiting\par \par Meds: PPI, bupropion, sertraline,\par PSh: no prior surgery\par Soc: no smoking, alcohol: 5 drinks a few times week, occasional marijuana use but overall cutting back\par FH: maternal and paternal GM pancreatic cancer

## 2020-12-23 NOTE — HISTORY OF PRESENT ILLNESS
[FreeTextEntry1] : 29 yo F with history of anxiety, GERD, previous eating disorder here for follow up\par \par She saw Dr. Saavedra one year ago for heartburn. She had EGD that showed HH. She was started on PPI, which she has been taking for one year. She feels like it helps 80% of her symptoms but it is not completely resolved. She is also unable to wean off the PPI. She tried stopping it for 1 week but symptoms were so severe\par She has tried lifestyle changes: Avoiding spicy foods, large quantities of food. Although she is overweight, she is not trying to loose weight because she had an eating disorder and this is a trigger for her. \par She also tried famotidine in addition to PPI but didn’t really help\par \par In addition to heartburn, she has regurgitation Often food comes up. If she is in public, she swallows it back down. If she is at home, she spits it out.\par Sometimes nausea, but no vomiting\par \par Meds: PPI, bupropion, sertraline,\par PSh: no prior surgery\par Soc: no smoking, alcohol: 5 drinks a few times week, occasional marijuana use but overall cutting back\par FH: maternal and paternal GM pancreatic cancer

## 2021-01-19 ENCOUNTER — OUTPATIENT (OUTPATIENT)
Dept: OUTPATIENT SERVICES | Facility: HOSPITAL | Age: 31
LOS: 1 days | Discharge: HOME | End: 2021-01-19
Payer: COMMERCIAL

## 2021-01-19 ENCOUNTER — APPOINTMENT (OUTPATIENT)
Dept: GASTROENTEROLOGY | Facility: HOSPITAL | Age: 31
End: 2021-01-19

## 2021-01-19 PROCEDURE — 91010 ESOPHAGUS MOTILITY STUDY: CPT | Mod: 26

## 2021-01-19 PROCEDURE — 91010 ESOPHAGUS MOTILITY STUDY: CPT

## 2021-01-27 ENCOUNTER — APPOINTMENT (OUTPATIENT)
Dept: UROGYNECOLOGY | Facility: CLINIC | Age: 31
End: 2021-01-27

## 2021-01-28 NOTE — HISTORY OF PRESENT ILLNESS
[FreeTextEntry1] : The pt is here for a f/u visit after undergoing office cystoscopy for symptoms of LUTS c/w IC.\par Urine cx neg.\par Cystoscopy neg

## 2021-02-17 ENCOUNTER — TRANSCRIPTION ENCOUNTER (OUTPATIENT)
Age: 31
End: 2021-02-17

## 2021-02-19 ENCOUNTER — TRANSCRIPTION ENCOUNTER (OUTPATIENT)
Age: 31
End: 2021-02-19

## 2021-02-19 ENCOUNTER — APPOINTMENT (OUTPATIENT)
Dept: GASTROENTEROLOGY | Facility: CLINIC | Age: 31
End: 2021-02-19
Payer: COMMERCIAL

## 2021-02-19 PROCEDURE — 99449 NTRPROF PH1/NTRNET/EHR 31/>: CPT

## 2021-02-24 ENCOUNTER — APPOINTMENT (OUTPATIENT)
Dept: UROGYNECOLOGY | Facility: CLINIC | Age: 31
End: 2021-02-24

## 2021-03-01 ENCOUNTER — TRANSCRIPTION ENCOUNTER (OUTPATIENT)
Age: 31
End: 2021-03-01

## 2021-03-01 DIAGNOSIS — R20.2 ANESTHESIA OF SKIN: ICD-10-CM

## 2021-03-01 DIAGNOSIS — R20.0 ANESTHESIA OF SKIN: ICD-10-CM

## 2021-04-05 ENCOUNTER — TRANSCRIPTION ENCOUNTER (OUTPATIENT)
Age: 31
End: 2021-04-05

## 2021-04-05 LAB
25(OH)D3 SERPL-MCNC: 27.6 NG/ML
ALBUMIN SERPL ELPH-MCNC: 4 G/DL
ALP BLD-CCNC: 139 U/L
ALT SERPL-CCNC: 26 U/L
ANION GAP SERPL CALC-SCNC: 12 MMOL/L
AST SERPL-CCNC: 29 U/L
BASOPHILS # BLD AUTO: 0.03 K/UL
BASOPHILS NFR BLD AUTO: 0.5 %
BILIRUB SERPL-MCNC: 0.3 MG/DL
BUN SERPL-MCNC: 10 MG/DL
CALCIUM SERPL-MCNC: 9.2 MG/DL
CHLORIDE SERPL-SCNC: 103 MMOL/L
CHOLEST SERPL-MCNC: 199 MG/DL
CO2 SERPL-SCNC: 22 MMOL/L
CREAT SERPL-MCNC: 0.7 MG/DL
CRP SERPL-MCNC: 16 MG/L
EOSINOPHIL # BLD AUTO: 0.09 K/UL
EOSINOPHIL NFR BLD AUTO: 1.5 %
ERYTHROCYTE [SEDIMENTATION RATE] IN BLOOD BY WESTERGREN METHOD: 25 MM/HR
ESTIMATED AVERAGE GLUCOSE: 103 MG/DL
FERRITIN SERPL-MCNC: 22 NG/ML
FOLATE SERPL-MCNC: 9 NG/ML
GLUCOSE SERPL-MCNC: 87 MG/DL
HBA1C MFR BLD HPLC: 5.2 %
HCT VFR BLD CALC: 38.8 %
HDLC SERPL-MCNC: 69 MG/DL
HGB BLD-MCNC: 12.7 G/DL
IMM GRANULOCYTES NFR BLD AUTO: 0.2 %
IRON SATN MFR SERPL: 20 %
IRON SERPL-MCNC: 82 UG/DL
LDLC SERPL CALC-MCNC: 109 MG/DL
LYMPHOCYTES # BLD AUTO: 2.19 K/UL
LYMPHOCYTES NFR BLD AUTO: 36.5 %
MAGNESIUM SERPL-MCNC: 2 MG/DL
MAN DIFF?: NORMAL
MCHC RBC-ENTMCNC: 27.9 PG
MCHC RBC-ENTMCNC: 32.7 GM/DL
MCV RBC AUTO: 85.3 FL
MONOCYTES # BLD AUTO: 0.48 K/UL
MONOCYTES NFR BLD AUTO: 8 %
NEUTROPHILS # BLD AUTO: 3.2 K/UL
NEUTROPHILS NFR BLD AUTO: 53.3 %
NONHDLC SERPL-MCNC: 130 MG/DL
PLATELET # BLD AUTO: 405 K/UL
POTASSIUM SERPL-SCNC: 4.1 MMOL/L
PROT SERPL-MCNC: 7.4 G/DL
RBC # BLD: 4.55 M/UL
RBC # FLD: 14.6 %
SODIUM SERPL-SCNC: 137 MMOL/L
TIBC SERPL-MCNC: 419 UG/DL
TRIGL SERPL-MCNC: 105 MG/DL
TSH SERPL-ACNC: 1.51 UIU/ML
UIBC SERPL-MCNC: 337 UG/DL
VIT B12 SERPL-MCNC: 358 PG/ML
WBC # FLD AUTO: 6 K/UL

## 2021-04-21 ENCOUNTER — TRANSCRIPTION ENCOUNTER (OUTPATIENT)
Age: 31
End: 2021-04-21

## 2021-04-29 ENCOUNTER — TRANSCRIPTION ENCOUNTER (OUTPATIENT)
Age: 31
End: 2021-04-29

## 2021-05-06 ENCOUNTER — TRANSCRIPTION ENCOUNTER (OUTPATIENT)
Age: 31
End: 2021-05-06

## 2021-05-12 ENCOUNTER — APPOINTMENT (OUTPATIENT)
Dept: INTERNAL MEDICINE | Facility: CLINIC | Age: 31
End: 2021-05-12
Payer: COMMERCIAL

## 2021-05-12 PROCEDURE — 99213 OFFICE O/P EST LOW 20 MIN: CPT | Mod: 95

## 2021-05-12 RX ORDER — FAMOTIDINE 40 MG/1
40 TABLET, FILM COATED ORAL
Qty: 90 | Refills: 0 | Status: COMPLETED | COMMUNITY
Start: 2019-12-27 | End: 2021-05-12

## 2021-05-12 RX ORDER — OMEPRAZOLE 40 MG/1
40 CAPSULE, DELAYED RELEASE ORAL
Qty: 90 | Refills: 0 | Status: COMPLETED | COMMUNITY
Start: 2020-11-10 | End: 2021-05-12

## 2021-05-18 ENCOUNTER — TRANSCRIPTION ENCOUNTER (OUTPATIENT)
Age: 31
End: 2021-05-18

## 2021-07-06 ENCOUNTER — NON-APPOINTMENT (OUTPATIENT)
Age: 31
End: 2021-07-06

## 2021-09-23 ENCOUNTER — APPOINTMENT (OUTPATIENT)
Dept: DERMATOLOGY | Facility: CLINIC | Age: 31
End: 2021-09-23
Payer: COMMERCIAL

## 2021-09-23 PROCEDURE — 99204 OFFICE O/P NEW MOD 45 MIN: CPT

## 2021-09-23 RX ORDER — ESOMEPRAZOLE MAGNESIUM 40 MG/1
40 CAPSULE, DELAYED RELEASE ORAL
Qty: 90 | Refills: 1 | Status: DISCONTINUED | COMMUNITY
Start: 2021-06-03 | End: 2021-09-23

## 2021-09-23 RX ORDER — PANTOPRAZOLE 40 MG/1
40 TABLET, DELAYED RELEASE ORAL
Qty: 90 | Refills: 0 | Status: DISCONTINUED | COMMUNITY
Start: 2021-06-03 | End: 2021-09-23

## 2021-09-23 NOTE — HISTORY OF PRESENT ILLNESS
[FreeTextEntry1] : rosacea and sweating  [de-identified] : 30 yo F new patient here for above\par redness and bumps on cheeks, more red with alcohol and spicy\par seems to be where mask rubs \par no persistent eye symptoms \par \par sweating all over but especially head and scalp, getting worse in recent years \par seemed to worsen after starting bupropion and sertraline - anxiety is better on these meds

## 2021-09-23 NOTE — PHYSICAL EXAM
[Alert] : alert [Oriented x 3] : ~L oriented x 3 [Well Nourished] : well nourished [Conjunctiva Non-injected] : conjunctiva non-injected [No Visual Lymphadenopathy] : no visual  lymphadenopathy [No Clubbing] : no clubbing [No Edema] : no edema [No Bromhidrosis] : no bromhidrosis [No Chromhidrosis] : no chromhidrosis [FreeTextEntry3] : telangiectasia and background erythema cheeks and nasal bridge \par <1 mm red papules arms \par

## 2021-10-18 ENCOUNTER — TRANSCRIPTION ENCOUNTER (OUTPATIENT)
Age: 31
End: 2021-10-18

## 2021-10-28 ENCOUNTER — APPOINTMENT (OUTPATIENT)
Dept: DERMATOLOGY | Facility: CLINIC | Age: 31
End: 2021-10-28
Payer: COMMERCIAL

## 2021-10-28 DIAGNOSIS — L85.3 XEROSIS CUTIS: ICD-10-CM

## 2021-10-28 PROCEDURE — 99213 OFFICE O/P EST LOW 20 MIN: CPT

## 2021-10-28 NOTE — HISTORY OF PRESENT ILLNESS
[FreeTextEntry1] : RHEA GUADALUPE [de-identified] : started glycopyrrolate 2 mos ago\par was taking 1 mg bid\par sweating improved but fine lines on face increased\par using cerave lotion and thrive facewash bid\par \par has laser scheduled with felipa in dec

## 2021-11-03 ENCOUNTER — TRANSCRIPTION ENCOUNTER (OUTPATIENT)
Age: 31
End: 2021-11-03

## 2021-11-16 ENCOUNTER — TRANSCRIPTION ENCOUNTER (OUTPATIENT)
Age: 31
End: 2021-11-16

## 2021-12-07 ENCOUNTER — APPOINTMENT (OUTPATIENT)
Dept: OBGYN | Facility: CLINIC | Age: 31
End: 2021-12-07
Payer: COMMERCIAL

## 2021-12-07 VITALS — DIASTOLIC BLOOD PRESSURE: 80 MMHG | HEIGHT: 68.5 IN | SYSTOLIC BLOOD PRESSURE: 130 MMHG

## 2021-12-07 DIAGNOSIS — Z01.419 ENCOUNTER FOR GYNECOLOGICAL EXAMINATION (GENERAL) (ROUTINE) W/OUT ABNORMAL FINDINGS: ICD-10-CM

## 2021-12-07 DIAGNOSIS — Z31.69 ENCOUNTER FOR OTHER GENERAL COUNSELING AND ADVICE ON PROCREATION: ICD-10-CM

## 2021-12-07 PROCEDURE — 99395 PREV VISIT EST AGE 18-39: CPT

## 2021-12-07 NOTE — PHYSICAL EXAM
Bill For Surgical Tray: no [Appropriately responsive] : appropriately responsive [Alert] : alert [No Acute Distress] : no acute distress [Soft] : soft [Non-tender] : non-tender [Non-distended] : non-distended [No Lesions] : no lesions [No Mass] : no mass [Oriented x3] : oriented x3 [Examination Of The Breasts] : a normal appearance [No Discharge] : no discharge [Breast Nipple Inversion Left] : inverted [No Masses] : no breast masses were palpable [Labia Majora] : normal [Labia Minora] : normal [Normal] : normal [Uterine Adnexae] : normal

## 2021-12-07 NOTE — COUNSELING
[Nutrition/ Exercise/ Weight Management] : nutrition, exercise, weight management [Vitamins/Supplements] : vitamins/supplements [Preconception Care/ Fertility options] : preconception care, fertility options [STD (testing, results, tx)] : STD (testing, results, tx) [Medication Management] : medication management

## 2021-12-08 ENCOUNTER — RX RENEWAL (OUTPATIENT)
Age: 31
End: 2021-12-08

## 2021-12-13 LAB
C TRACH RRNA SPEC QL NAA+PROBE: NOT DETECTED
N GONORRHOEA RRNA SPEC QL NAA+PROBE: NOT DETECTED
SOURCE AMPLIFICATION: NORMAL
SOURCE AMPLIFICATION: NORMAL
T VAGINALIS RRNA SPEC QL NAA+PROBE: NOT DETECTED

## 2021-12-14 LAB — CYTOLOGY CVX/VAG DOC THIN PREP: NORMAL

## 2021-12-21 PROBLEM — Z01.419 WELL WOMAN EXAM WITH ROUTINE GYNECOLOGICAL EXAM: Status: ACTIVE | Noted: 2019-01-22

## 2021-12-21 NOTE — PLAN
[FreeTextEntry1] : 31 y.o. G0 presenting for well-woman exam and preconception counseling. Discussed with patient that it can take 6 months to a year to conceive and that that is normal. If she wishes to increase her chances in order to expedite the process, she may choose to use OPKs to time sexual intercourse in her fertile window. If she has not gotten pregnant in 6 months of using OPKs, she can return for further evaluation. Otherwise she should return with positive pregnancy test or if unable to conceive after 1 year of unprotected intercourse. Also discussed that her current medications are not contraindicated in pregnancy and that she may continue them while attempting to conceive. Patient should continue to take prenatal vitamins while attempting to conceive\par \par Shameka Degroot MD PGY2\par VALENTIN Sanchez\par

## 2021-12-21 NOTE — HISTORY OF PRESENT ILLNESS
[Patient reported PAP Smear was normal] : Patient reported PAP Smear was normal [Y] : Patient is sexually active [Normal Amount/Duration] :  normal amount and duration [Regular Cycle Intervals] : periods have been regular [Currently Active] : currently active [N] : Patient reports normal menses [No] : never pregnant [Men] : men [Vaginal] : vaginal [PapSmeardate] : 2019 [LMPDate] : 11/14/2021 [MensesFreq] : 28 [MensesLength] : 5 [PGHxTotal] : 0 [TextBox_6] : 11/14/21 [FreeTextEntry1] : 11/14/2021

## 2021-12-29 ENCOUNTER — TRANSCRIPTION ENCOUNTER (OUTPATIENT)
Age: 31
End: 2021-12-29

## 2022-02-11 ENCOUNTER — TRANSCRIPTION ENCOUNTER (OUTPATIENT)
Age: 32
End: 2022-02-11

## 2022-02-20 ENCOUNTER — TRANSCRIPTION ENCOUNTER (OUTPATIENT)
Age: 32
End: 2022-02-20

## 2022-04-21 ENCOUNTER — APPOINTMENT (OUTPATIENT)
Dept: DERMATOLOGY | Facility: CLINIC | Age: 32
End: 2022-04-21
Payer: COMMERCIAL

## 2022-04-21 DIAGNOSIS — L71.9 ROSACEA, UNSPECIFIED: ICD-10-CM

## 2022-04-21 DIAGNOSIS — D23.9 OTHER BENIGN NEOPLASM OF SKIN, UNSPECIFIED: ICD-10-CM

## 2022-04-21 PROCEDURE — 99214 OFFICE O/P EST MOD 30 MIN: CPT

## 2022-04-21 NOTE — HISTORY OF PRESENT ILLNESS
[FreeTextEntry1] : RHEA GUADALUPE [de-identified] : estab patient\par last visit october glycopyrrolate 1 mg bid was helping but drying\par dec to 0.5 mg bid, ok but some sweating and worried about summer \par face less dry with switch to cream and not washing in am\par \par had laser treatment with geronemus - big improvement, another tx in a couple weeks\par \par bump on nose, not new\par

## 2022-04-21 NOTE — PHYSICAL EXAM
[Alert] : alert [Oriented x 3] : ~L oriented x 3 [Well Nourished] : well nourished [Conjunctiva Non-injected] : conjunctiva non-injected [No Visual Lymphadenopathy] : no visual  lymphadenopathy [No Clubbing] : no clubbing [No Edema] : no edema [No Bromhidrosis] : no bromhidrosis [No Chromhidrosis] : no chromhidrosis [FreeTextEntry3] : flesh colored symmetric papule without vessels L nose

## 2022-05-12 ENCOUNTER — TRANSCRIPTION ENCOUNTER (OUTPATIENT)
Age: 32
End: 2022-05-12

## 2022-05-25 ENCOUNTER — APPOINTMENT (OUTPATIENT)
Dept: INTERNAL MEDICINE | Facility: CLINIC | Age: 32
End: 2022-05-25
Payer: COMMERCIAL

## 2022-05-25 VITALS
HEART RATE: 92 BPM | SYSTOLIC BLOOD PRESSURE: 130 MMHG | OXYGEN SATURATION: 99 % | TEMPERATURE: 97.2 F | DIASTOLIC BLOOD PRESSURE: 91 MMHG

## 2022-05-25 VITALS — DIASTOLIC BLOOD PRESSURE: 85 MMHG | SYSTOLIC BLOOD PRESSURE: 130 MMHG

## 2022-05-25 DIAGNOSIS — Z30.41 ENCOUNTER FOR SURVEILLANCE OF CONTRACEPTIVE PILLS: ICD-10-CM

## 2022-05-25 DIAGNOSIS — Z13.220 ENCOUNTER FOR SCREENING FOR LIPOID DISORDERS: ICD-10-CM

## 2022-05-25 DIAGNOSIS — Z13.1 ENCOUNTER FOR SCREENING FOR DIABETES MELLITUS: ICD-10-CM

## 2022-05-25 DIAGNOSIS — K44.9 DIAPHRAGMATIC HERNIA W/OUT OBSTRUCTION OR GANGRENE: ICD-10-CM

## 2022-05-25 DIAGNOSIS — R03.0 ELEVATED BLOOD-PRESSURE READING, W/OUT DIAGNOSIS OF HYPERTENSION: ICD-10-CM

## 2022-05-25 DIAGNOSIS — E55.9 VITAMIN D DEFICIENCY, UNSPECIFIED: ICD-10-CM

## 2022-05-25 DIAGNOSIS — Z87.898 PERSONAL HISTORY OF OTHER SPECIFIED CONDITIONS: ICD-10-CM

## 2022-05-25 PROCEDURE — 36415 COLL VENOUS BLD VENIPUNCTURE: CPT

## 2022-05-25 PROCEDURE — 99395 PREV VISIT EST AGE 18-39: CPT | Mod: 25

## 2022-05-25 RX ORDER — VALACYCLOVIR 1 G/1
1 TABLET, FILM COATED ORAL TWICE DAILY
Qty: 12 | Refills: 2 | Status: COMPLETED | COMMUNITY
Start: 2019-02-20 | End: 2022-05-25

## 2022-05-25 RX ORDER — METRONIDAZOLE 7.5 MG/G
0.75 GEL TOPICAL TWICE DAILY
Qty: 1 | Refills: 1 | Status: COMPLETED | COMMUNITY
Start: 2020-11-04 | End: 2022-05-25

## 2022-06-06 ENCOUNTER — TRANSCRIPTION ENCOUNTER (OUTPATIENT)
Age: 32
End: 2022-06-06

## 2022-06-14 ENCOUNTER — TRANSCRIPTION ENCOUNTER (OUTPATIENT)
Age: 32
End: 2022-06-14

## 2022-06-14 LAB
25(OH)D3 SERPL-MCNC: 20.2 NG/ML
ALBUMIN SERPL ELPH-MCNC: 4.6 G/DL
ALP BLD-CCNC: 108 U/L
ALT SERPL-CCNC: 43 U/L
ANION GAP SERPL CALC-SCNC: 12 MMOL/L
APPEARANCE: CLEAR
AST SERPL-CCNC: 36 U/L
BACTERIA: ABNORMAL
BASOPHILS # BLD AUTO: 0.04 K/UL
BASOPHILS NFR BLD AUTO: 0.6 %
BILIRUB SERPL-MCNC: 0.3 MG/DL
BILIRUBIN URINE: NEGATIVE
BLOOD URINE: NEGATIVE
BUN SERPL-MCNC: 10 MG/DL
CALCIUM SERPL-MCNC: 9.7 MG/DL
CHLORIDE SERPL-SCNC: 102 MMOL/L
CHOLEST SERPL-MCNC: 178 MG/DL
CO2 SERPL-SCNC: 25 MMOL/L
COLOR: YELLOW
CREAT SERPL-MCNC: 0.68 MG/DL
CREAT SPEC-SCNC: 130 MG/DL
EGFR: 118 ML/MIN/1.73M2
EOSINOPHIL # BLD AUTO: 0.06 K/UL
EOSINOPHIL NFR BLD AUTO: 0.9 %
ESTIMATED AVERAGE GLUCOSE: 100 MG/DL
GLUCOSE QUALITATIVE U: NEGATIVE
GLUCOSE SERPL-MCNC: 95 MG/DL
HBA1C MFR BLD HPLC: 5.1 %
HCT VFR BLD CALC: 39.8 %
HDLC SERPL-MCNC: 66 MG/DL
HGB BLD-MCNC: 12.4 G/DL
HYALINE CASTS: 1 /LPF
IMM GRANULOCYTES NFR BLD AUTO: 0.1 %
KETONES URINE: NEGATIVE
LDLC SERPL CALC-MCNC: 87 MG/DL
LEUKOCYTE ESTERASE URINE: NEGATIVE
LYMPHOCYTES # BLD AUTO: 1.87 K/UL
LYMPHOCYTES NFR BLD AUTO: 26.8 %
MAN DIFF?: NORMAL
MCHC RBC-ENTMCNC: 28.2 PG
MCHC RBC-ENTMCNC: 31.2 GM/DL
MCV RBC AUTO: 90.7 FL
MICROALBUMIN 24H UR DL<=1MG/L-MCNC: <1.2 MG/DL
MICROALBUMIN/CREAT 24H UR-RTO: NORMAL MG/G
MICROSCOPIC-UA: NORMAL
MONOCYTES # BLD AUTO: 0.5 K/UL
MONOCYTES NFR BLD AUTO: 7.2 %
NEUTROPHILS # BLD AUTO: 4.49 K/UL
NEUTROPHILS NFR BLD AUTO: 64.4 %
NITRITE URINE: NEGATIVE
NONHDLC SERPL-MCNC: 112 MG/DL
PH URINE: 7.5
PLATELET # BLD AUTO: 361 K/UL
POTASSIUM SERPL-SCNC: 4.8 MMOL/L
PROT SERPL-MCNC: 7.3 G/DL
PROTEIN URINE: NEGATIVE
RBC # BLD: 4.39 M/UL
RBC # FLD: 15.2 %
RED BLOOD CELLS URINE: 2 /HPF
SODIUM SERPL-SCNC: 138 MMOL/L
SPECIFIC GRAVITY URINE: 1.02
SQUAMOUS EPITHELIAL CELLS: 1 /HPF
TRIGL SERPL-MCNC: 126 MG/DL
TSH SERPL-ACNC: 2.61 UIU/ML
UROBILINOGEN URINE: NORMAL
WBC # FLD AUTO: 6.97 K/UL
WHITE BLOOD CELLS URINE: 1 /HPF

## 2022-06-14 NOTE — HISTORY OF PRESENT ILLNESS
[FreeTextEntry1] : CPE [de-identified] : mood: her therapist feels PMDD probably contributing\par \par She did gain weight but previously was having too much control issues over food (history of eating disorder) so she thinks mentally she's in a healthier place with the way she thinks about food, although she admits she probably does need to pay a little more attention to reducing certain types of foods

## 2022-06-21 ENCOUNTER — RX RENEWAL (OUTPATIENT)
Age: 32
End: 2022-06-21

## 2022-06-24 ENCOUNTER — NON-APPOINTMENT (OUTPATIENT)
Age: 32
End: 2022-06-24

## 2022-07-01 ENCOUNTER — RX RENEWAL (OUTPATIENT)
Age: 32
End: 2022-07-01

## 2022-08-08 ENCOUNTER — TRANSCRIPTION ENCOUNTER (OUTPATIENT)
Age: 32
End: 2022-08-08

## 2022-08-08 ENCOUNTER — RX RENEWAL (OUTPATIENT)
Age: 32
End: 2022-08-08

## 2022-08-17 ENCOUNTER — TRANSCRIPTION ENCOUNTER (OUTPATIENT)
Age: 32
End: 2022-08-17

## 2022-09-27 ENCOUNTER — TRANSCRIPTION ENCOUNTER (OUTPATIENT)
Age: 32
End: 2022-09-27

## 2022-10-03 ENCOUNTER — APPOINTMENT (OUTPATIENT)
Dept: DERMATOLOGY | Facility: CLINIC | Age: 32
End: 2022-10-03

## 2022-10-03 DIAGNOSIS — R61 GENERALIZED HYPERHIDROSIS: ICD-10-CM
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
## 2022-10-03 DIAGNOSIS — D18.01 HEMANGIOMA OF SKIN AND SUBCUTANEOUS TISSUE: ICD-10-CM

## 2022-10-03 PROCEDURE — 99214 OFFICE O/P EST MOD 30 MIN: CPT

## 2022-10-03 NOTE — PHYSICAL EXAM
[Alert] : alert [Oriented x 3] : ~L oriented x 3 [Well Nourished] : well nourished [Conjunctiva Non-injected] : conjunctiva non-injected [No Visual Lymphadenopathy] : no visual  lymphadenopathy [No Clubbing] : no clubbing [No Edema] : no edema [No Bromhidrosis] : no bromhidrosis [No Chromhidrosis] : no chromhidrosis [FreeTextEntry3] : excoriated pink papules scattered extremities, some linear

## 2022-10-03 NOTE — HISTORY OF PRESENT ILLNESS
[FreeTextEntry1] : rash [de-identified] : hx rosacea, hyperhidrosis\par last visit april\par \par rash started 2 weeks ago\par urgent care told bed bugs\par very itchy but less so over last week\par benadryl pills and hydrocortisone \par \par 2 new spots right arm but not itchy, may not be same\par got all others same time 2 weeks ago \par \par taking glycopyrrolate -no side effects, working well

## 2022-12-30 ENCOUNTER — RX RENEWAL (OUTPATIENT)
Age: 32
End: 2022-12-30

## 2023-01-03 ENCOUNTER — RX RENEWAL (OUTPATIENT)
Age: 33
End: 2023-01-03

## 2023-02-13 ENCOUNTER — APPOINTMENT (OUTPATIENT)
Dept: DERMATOLOGY | Facility: CLINIC | Age: 33
End: 2023-02-13

## 2023-02-14 ENCOUNTER — APPOINTMENT (OUTPATIENT)
Dept: ORTHOPEDIC SURGERY | Facility: CLINIC | Age: 33
End: 2023-02-14
Payer: COMMERCIAL

## 2023-02-14 VITALS — HEIGHT: 68.5 IN | WEIGHT: 220 LBS | BODY MASS INDEX: 32.96 KG/M2 | RESPIRATION RATE: 16 BRPM

## 2023-02-14 PROCEDURE — 29065 APPL CST SHO TO HAND LNG ARM: CPT | Mod: RT

## 2023-02-14 PROCEDURE — 99204 OFFICE O/P NEW MOD 45 MIN: CPT | Mod: 25

## 2023-02-14 PROCEDURE — 73110 X-RAY EXAM OF WRIST: CPT | Mod: 50

## 2023-03-14 ENCOUNTER — APPOINTMENT (OUTPATIENT)
Dept: ORTHOPEDIC SURGERY | Facility: CLINIC | Age: 33
End: 2023-03-14
Payer: COMMERCIAL

## 2023-03-14 PROCEDURE — 73110 X-RAY EXAM OF WRIST: CPT | Mod: RT

## 2023-03-14 PROCEDURE — 99213 OFFICE O/P EST LOW 20 MIN: CPT

## 2023-04-18 ENCOUNTER — APPOINTMENT (OUTPATIENT)
Dept: ORTHOPEDIC SURGERY | Facility: CLINIC | Age: 33
End: 2023-04-18
Payer: COMMERCIAL

## 2023-04-18 DIAGNOSIS — S69.91XD UNSPECIFIED INJURY OF RIGHT WRIST, HAND AND FINGER(S), SUBSEQUENT ENCOUNTER: ICD-10-CM

## 2023-04-18 PROCEDURE — 73110 X-RAY EXAM OF WRIST: CPT | Mod: RT

## 2023-04-18 PROCEDURE — 99213 OFFICE O/P EST LOW 20 MIN: CPT

## 2023-05-30 ENCOUNTER — APPOINTMENT (OUTPATIENT)
Dept: ORTHOPEDIC SURGERY | Facility: CLINIC | Age: 33
End: 2023-05-30

## 2023-06-20 ENCOUNTER — RX RENEWAL (OUTPATIENT)
Age: 33
End: 2023-06-20

## 2023-06-29 ENCOUNTER — APPOINTMENT (OUTPATIENT)
Dept: NEUROLOGY | Facility: CLINIC | Age: 33
End: 2023-06-29

## 2023-07-04 ENCOUNTER — TRANSCRIPTION ENCOUNTER (OUTPATIENT)
Age: 33
End: 2023-07-04

## 2023-07-04 ENCOUNTER — RX RENEWAL (OUTPATIENT)
Age: 33
End: 2023-07-04

## 2023-07-05 ENCOUNTER — NON-APPOINTMENT (OUTPATIENT)
Age: 33
End: 2023-07-05

## 2023-07-05 ENCOUNTER — RX RENEWAL (OUTPATIENT)
Age: 33
End: 2023-07-05

## 2023-07-06 ENCOUNTER — TRANSCRIPTION ENCOUNTER (OUTPATIENT)
Age: 33
End: 2023-07-06

## 2023-08-01 ENCOUNTER — RX RENEWAL (OUTPATIENT)
Age: 33
End: 2023-08-01

## 2023-08-28 ENCOUNTER — APPOINTMENT (OUTPATIENT)
Dept: INTERNAL MEDICINE | Facility: CLINIC | Age: 33
End: 2023-08-28
Payer: COMMERCIAL

## 2023-08-28 VITALS
SYSTOLIC BLOOD PRESSURE: 121 MMHG | TEMPERATURE: 96.7 F | HEART RATE: 73 BPM | HEIGHT: 68.5 IN | BODY MASS INDEX: 38.95 KG/M2 | OXYGEN SATURATION: 95 % | DIASTOLIC BLOOD PRESSURE: 87 MMHG | WEIGHT: 260 LBS

## 2023-08-28 DIAGNOSIS — F41.9 ANXIETY DISORDER, UNSPECIFIED: ICD-10-CM

## 2023-08-28 DIAGNOSIS — Z00.00 ENCOUNTER FOR GENERAL ADULT MEDICAL EXAMINATION W/OUT ABNORMAL FINDINGS: ICD-10-CM

## 2023-08-28 DIAGNOSIS — F32.A ANXIETY DISORDER, UNSPECIFIED: ICD-10-CM

## 2023-08-28 PROCEDURE — 99395 PREV VISIT EST AGE 18-39: CPT

## 2023-08-28 NOTE — HISTORY OF PRESENT ILLNESS
[de-identified] : was having more visual issues that she thought was an ocular migraine, referred to neuro-ophthalmologist and found to have optic neuritis. Seeing neurologist Dr. Roman, just diagnosed with MS. She's waiting for approval on Ocrevus.

## 2023-09-06 ENCOUNTER — APPOINTMENT (OUTPATIENT)
Dept: INTERNAL MEDICINE | Facility: CLINIC | Age: 33
End: 2023-09-06

## 2023-10-24 PROBLEM — Z00.00 ENCOUNTER FOR PREVENTIVE HEALTH EXAMINATION: Status: ACTIVE | Noted: 2017-06-12

## 2023-10-24 PROBLEM — F41.9 ANXIETY AND DEPRESSION: Status: ACTIVE | Noted: 2020-05-26

## 2023-11-07 ENCOUNTER — TRANSCRIPTION ENCOUNTER (OUTPATIENT)
Age: 33
End: 2023-11-07

## 2023-12-19 ENCOUNTER — TRANSCRIPTION ENCOUNTER (OUTPATIENT)
Age: 33
End: 2023-12-19

## 2023-12-26 ENCOUNTER — RX RENEWAL (OUTPATIENT)
Age: 33
End: 2023-12-26

## 2024-01-07 ENCOUNTER — TRANSCRIPTION ENCOUNTER (OUTPATIENT)
Age: 34
End: 2024-01-07

## 2024-01-23 ENCOUNTER — RX RENEWAL (OUTPATIENT)
Age: 34
End: 2024-01-23

## 2024-01-23 RX ORDER — SERTRALINE 25 MG/1
25 TABLET, FILM COATED ORAL DAILY
Qty: 90 | Refills: 1 | Status: ACTIVE | COMMUNITY
Start: 2020-09-11 | End: 1900-01-01

## 2024-01-23 RX ORDER — BUPROPION HYDROCHLORIDE 150 MG/1
150 TABLET, EXTENDED RELEASE ORAL
Qty: 90 | Refills: 1 | Status: ACTIVE | COMMUNITY
Start: 2020-05-26 | End: 1900-01-01

## 2024-01-30 ENCOUNTER — RX RENEWAL (OUTPATIENT)
Age: 34
End: 2024-01-30

## 2024-02-11 ENCOUNTER — TRANSCRIPTION ENCOUNTER (OUTPATIENT)
Age: 34
End: 2024-02-11

## 2024-02-14 ENCOUNTER — TRANSCRIPTION ENCOUNTER (OUTPATIENT)
Age: 34
End: 2024-02-14

## 2024-02-16 ENCOUNTER — TRANSCRIPTION ENCOUNTER (OUTPATIENT)
Age: 34
End: 2024-02-16

## 2024-02-20 ENCOUNTER — TRANSCRIPTION ENCOUNTER (OUTPATIENT)
Age: 34
End: 2024-02-20

## 2024-03-12 ENCOUNTER — APPOINTMENT (OUTPATIENT)
Dept: INTERNAL MEDICINE | Facility: CLINIC | Age: 34
End: 2024-03-12

## 2024-03-27 ENCOUNTER — RX RENEWAL (OUTPATIENT)
Age: 34
End: 2024-03-27

## 2024-04-01 ENCOUNTER — APPOINTMENT (OUTPATIENT)
Dept: INTERNAL MEDICINE | Facility: CLINIC | Age: 34
End: 2024-04-01
Payer: COMMERCIAL

## 2024-04-01 ENCOUNTER — NON-APPOINTMENT (OUTPATIENT)
Age: 34
End: 2024-04-01

## 2024-04-01 VITALS
DIASTOLIC BLOOD PRESSURE: 89 MMHG | WEIGHT: 260 LBS | HEIGHT: 68.5 IN | HEART RATE: 89 BPM | OXYGEN SATURATION: 95 % | BODY MASS INDEX: 38.95 KG/M2 | TEMPERATURE: 97.7 F | SYSTOLIC BLOOD PRESSURE: 127 MMHG

## 2024-04-01 DIAGNOSIS — R11.0 NAUSEA: ICD-10-CM

## 2024-04-01 DIAGNOSIS — R10.9 UNSPECIFIED ABDOMINAL PAIN: ICD-10-CM

## 2024-04-01 DIAGNOSIS — G35 MULTIPLE SCLEROSIS: ICD-10-CM

## 2024-04-01 DIAGNOSIS — R11.2 NAUSEA WITH VOMITING, UNSPECIFIED: ICD-10-CM

## 2024-04-01 PROCEDURE — 99214 OFFICE O/P EST MOD 30 MIN: CPT

## 2024-04-01 PROCEDURE — 36415 COLL VENOUS BLD VENIPUNCTURE: CPT

## 2024-04-01 PROCEDURE — G2211 COMPLEX E/M VISIT ADD ON: CPT

## 2024-04-01 RX ORDER — GLYCOPYRROLATE 1 MG/1
1 TABLET ORAL
Qty: 45 | Refills: 3 | Status: COMPLETED | COMMUNITY
Start: 2021-09-23 | End: 2024-04-01

## 2024-04-10 ENCOUNTER — TRANSCRIPTION ENCOUNTER (OUTPATIENT)
Age: 34
End: 2024-04-10

## 2024-04-12 ENCOUNTER — TRANSCRIPTION ENCOUNTER (OUTPATIENT)
Age: 34
End: 2024-04-12

## 2024-04-15 ENCOUNTER — TRANSCRIPTION ENCOUNTER (OUTPATIENT)
Age: 34
End: 2024-04-15

## 2024-04-15 LAB
AMYLASE/CREAT SERPL: 51 U/L
APPEARANCE: CLEAR
BACTERIA UR CULT: NORMAL
BACTERIA: NEGATIVE /HPF
BILIRUBIN URINE: NEGATIVE
BLOOD URINE: ABNORMAL
CAST: 0 /LPF
COLOR: YELLOW
EPITHELIAL CELLS: 2 /HPF
GLUCOSE QUALITATIVE U: NEGATIVE MG/DL
KETONES URINE: NEGATIVE MG/DL
LEUKOCYTE ESTERASE URINE: NEGATIVE
LPL SERPL-CCNC: 22 U/L
MICROSCOPIC-UA: NORMAL
NITRITE URINE: NEGATIVE
PH URINE: 7
PROTEIN URINE: NEGATIVE MG/DL
RED BLOOD CELLS URINE: 1 /HPF
REVIEW: NORMAL
SPECIFIC GRAVITY URINE: 1.02
UROBILINOGEN URINE: 0.2 MG/DL
WHITE BLOOD CELLS URINE: 1 /HPF

## 2024-04-15 NOTE — HISTORY OF PRESENT ILLNESS
[FreeTextEntry8] : 33 year old woman presenting for acute visit for prior flank pain in February.  She had CT ordered by her neurologist, but it wasn't covered by her insurance. She had abd US that showed non obstructing gallstones up to 1 cm but no signs of cholecystitis.   Labs from 2/23 reviewed on her My Chart: CBC WNL aside from mild anemia. CMP WNL  Had 2 weeks of nausea, fatigue, initially L then bilateral flank pain that felt dull but constant. There were some times she felt it was worse after eating a meal. The initial few days were after her Tysabri dose so she thought it might be a side effects but she'd never had nausea prior to that. No urinary symptoms changed from baseline: has some hesitancy, some nocturia that's probably due to MS.

## 2024-04-15 NOTE — PHYSICAL EXAM
[No Acute Distress] : no acute distress [Normal Sclera/Conjunctiva] : normal sclera/conjunctiva [Alert and Oriented x3] : oriented to person, place, and time [Normal] : affect was normal and insight and judgment were intact [de-identified] : Mild tenderness of right upper quadrant

## 2024-04-15 NOTE — HEALTH RISK ASSESSMENT
[Former] : Former [0-4] : 0-4 [< 15 Years] : < 15 Years [0] : 2) Feeling down, depressed, or hopeless: Not at all (0) [TXB2Ovaws] : 0

## 2024-04-30 ENCOUNTER — APPOINTMENT (OUTPATIENT)
Dept: INTERNAL MEDICINE | Facility: CLINIC | Age: 34
End: 2024-04-30
Payer: COMMERCIAL

## 2024-04-30 DIAGNOSIS — R53.83 OTHER FATIGUE: ICD-10-CM

## 2024-04-30 DIAGNOSIS — R11.0 NAUSEA: ICD-10-CM

## 2024-04-30 PROCEDURE — 99213 OFFICE O/P EST LOW 20 MIN: CPT

## 2024-04-30 PROCEDURE — G2211 COMPLEX E/M VISIT ADD ON: CPT | Mod: NC,1L

## 2024-04-30 RX ORDER — NATALIZUMAB 300 MG/15ML
300 INJECTION INTRAVENOUS
Refills: 0 | Status: ACTIVE | COMMUNITY
Start: 2024-04-01

## 2024-05-01 ENCOUNTER — TRANSCRIPTION ENCOUNTER (OUTPATIENT)
Age: 34
End: 2024-05-01

## 2024-05-14 ENCOUNTER — TRANSCRIPTION ENCOUNTER (OUTPATIENT)
Age: 34
End: 2024-05-14

## 2024-05-19 NOTE — HISTORY OF PRESENT ILLNESS
[Home] : at home, [unfilled] , at the time of the visit. [Medical Office: ([facility])___] : at the medical office located in  [Verbal consent obtained from patient] : the patient, [unfilled] [FreeTextEntry1] : 33 year old woman presenting for telehealth visit to follow up MRI findings: gallstone [de-identified] : We reviewed that MRI showed distended gallbladder but without wall thickening or pericholecystic fluid. Solitary gallstone layering within the GB neck measuring 1.1 cm. The remainder of MRI including liver, pancreas, spleen, adrenal gland, kidneys, bowel unremarkable.

## 2024-05-24 DIAGNOSIS — R63.5 ABNORMAL WEIGHT GAIN: ICD-10-CM

## 2024-05-30 ENCOUNTER — TRANSCRIPTION ENCOUNTER (OUTPATIENT)
Age: 34
End: 2024-05-30

## 2024-06-13 RX ORDER — ALPRAZOLAM 0.5 MG/1
0.5 TABLET ORAL DAILY
Qty: 30 | Refills: 0 | Status: ACTIVE | COMMUNITY
Start: 2019-06-12 | End: 1900-01-01

## 2024-06-18 ENCOUNTER — APPOINTMENT (OUTPATIENT)
Dept: GASTROENTEROLOGY | Facility: CLINIC | Age: 34
End: 2024-06-18
Payer: COMMERCIAL

## 2024-06-18 VITALS
RESPIRATION RATE: 16 BRPM | SYSTOLIC BLOOD PRESSURE: 130 MMHG | HEIGHT: 68.5 IN | HEART RATE: 94 BPM | BODY MASS INDEX: 37.46 KG/M2 | WEIGHT: 250 LBS | OXYGEN SATURATION: 98 % | TEMPERATURE: 96.2 F | DIASTOLIC BLOOD PRESSURE: 80 MMHG

## 2024-06-18 DIAGNOSIS — K80.20 CALCULUS OF GALLBLADDER W/OUT CHOLECYSTITIS W/OUT OBSTRUCTION: ICD-10-CM

## 2024-06-18 DIAGNOSIS — R19.4 CHANGE IN BOWEL HABIT: ICD-10-CM

## 2024-06-18 DIAGNOSIS — K21.9 GASTRO-ESOPHAGEAL REFLUX DISEASE W/OUT ESOPHAGITIS: ICD-10-CM

## 2024-06-18 PROCEDURE — 99205 OFFICE O/P NEW HI 60 MIN: CPT

## 2024-06-18 NOTE — ASSESSMENT
Verbal shift change report given to OMAR Bowles (oncoming nurse) by Sheri Kaufman RN (offgoing nurse). Report included the following information SBAR. 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 Covington for CCY evaluation, all questions answered - Fiber supplementation and peppermint PRN for post-infectious IBS answered - F/u PRN

## 2024-06-18 NOTE — HISTORY OF PRESENT ILLNESS
[FreeTextEntry1] : CAMPBELL HERNANDEZ is a 34 year F here for GB neck stone. She has a history of MS, anxiety  GI History: Longstanding reflux, unable to wean off reflux. EGD w/ hiatal hernia but otherwise normal, in 2019.  Colonoscopy 6 years ago was normal. Was having some vague GI issues.  Current symptoms: Having flank pain since February. U/S showed non-obstructing gallstones (largest 1 cm) w/o cholecystitis.   2 recent "stomach bugs." Both patient and her partner had 4 days of diarrhea about one month ago. Pt was recovering and travelling to [place], and got sick again - 18 hours of diarrhea and vomiting. Since then, feels more constipated and nauseous and feels generally off. More yellow stool.  BMs are usually daily, now every 3 days. Variable consistency. Some blood on the tissue paper.   GI ROS negative for unintentional weight loss, fevers/chills, dysphagia, reflux, bloating, nausea, vomiting, early satiety, melena. Patient denies NSAID use.  Current Meds: Buproprion, Omeprazole, Setraline, Natalizumab All: NKDA FHx: Aunt w/ celiac, both GM had pancreatic cancer (50s and 80s) SHx: Former smoker, social EtOH, works as a    Relevant Exam: Well appearing  Labs: CMP reviewed, AlkP 139 in 2021 Amylase/Lipase wnl 2024  Imaging: Abd U/S 2/2024 shows gallstones up to 1 cm, no cholecystitis, CBD 6 mm w/o stones MRCP 4/2024 normal liver, in intra/extrahepatic bile duct dilation, CBD 5 mm, no stones or stricture. GB distended w/ gallstone in GB neck 1.1 cm. Normal pancreas.

## 2024-06-19 ENCOUNTER — RX RENEWAL (OUTPATIENT)
Age: 34
End: 2024-06-19

## 2024-06-19 LAB
HCT VFR BLD CALC: 35.2 %
HGB BLD-MCNC: 11.1 G/DL
MCHC RBC-ENTMCNC: 23.7 PG
MCHC RBC-ENTMCNC: 31.5 GM/DL
MCV RBC AUTO: 75.1 FL
PLATELET # BLD AUTO: 379 K/UL
RBC # BLD: 4.69 M/UL
RBC # FLD: 17.1 %
TTG IGA SER IA-ACNC: <0.5 U/ML
TTG IGA SER-ACNC: NEGATIVE
WBC # FLD AUTO: 8.39 K/UL

## 2024-06-19 RX ORDER — OMEPRAZOLE 40 MG/1
40 CAPSULE, DELAYED RELEASE ORAL
Qty: 90 | Refills: 1 | Status: ACTIVE | COMMUNITY
Start: 2019-09-23 | End: 1900-01-01

## 2024-06-20 ENCOUNTER — NON-APPOINTMENT (OUTPATIENT)
Age: 34
End: 2024-06-20

## 2024-06-24 LAB
ALBUMIN SERPL ELPH-MCNC: 4.2 G/DL
ALP BLD-CCNC: 96 U/L
ALT SERPL-CCNC: 17 U/L
ANION GAP SERPL CALC-SCNC: 12 MMOL/L
AST SERPL-CCNC: 15 U/L
BILIRUB SERPL-MCNC: 0.2 MG/DL
BUN SERPL-MCNC: 11 MG/DL
CALCIUM SERPL-MCNC: 9.4 MG/DL
CHLORIDE SERPL-SCNC: 103 MMOL/L
CO2 SERPL-SCNC: 22 MMOL/L
CREAT SERPL-MCNC: 0.8 MG/DL
EGFR: 98 ML/MIN/1.73M2
GLUCOSE SERPL-MCNC: 84 MG/DL
IGA SER QL IEP: 114 MG/DL
POTASSIUM SERPL-SCNC: 4.8 MMOL/L
PROT SERPL-MCNC: 7.1 G/DL
SODIUM SERPL-SCNC: 138 MMOL/L
TSH SERPL-ACNC: 2.56 UIU/ML

## 2024-07-23 ENCOUNTER — RX RENEWAL (OUTPATIENT)
Age: 34
End: 2024-07-23

## 2024-08-21 ENCOUNTER — APPOINTMENT (OUTPATIENT)
Dept: SURGERY | Facility: CLINIC | Age: 34
End: 2024-08-21

## 2024-08-25 PROBLEM — E66.9 OBESITY (BMI 30-39.9): Status: ACTIVE | Noted: 2024-08-25

## 2024-10-16 ENCOUNTER — APPOINTMENT (OUTPATIENT)
Dept: INTERNAL MEDICINE | Facility: CLINIC | Age: 34
End: 2024-10-16

## 2024-11-19 ENCOUNTER — RX RENEWAL (OUTPATIENT)
Age: 34
End: 2024-11-19

## 2025-01-23 ENCOUNTER — RX RENEWAL (OUTPATIENT)
Age: 35
End: 2025-01-23

## 2025-02-10 ENCOUNTER — RX RENEWAL (OUTPATIENT)
Age: 35
End: 2025-02-10

## 2025-02-21 ENCOUNTER — NON-APPOINTMENT (OUTPATIENT)
Age: 35
End: 2025-02-21

## 2025-02-21 ENCOUNTER — TRANSCRIPTION ENCOUNTER (OUTPATIENT)
Age: 35
End: 2025-02-21

## 2025-02-24 ENCOUNTER — TRANSCRIPTION ENCOUNTER (OUTPATIENT)
Age: 35
End: 2025-02-24

## 2025-02-28 ENCOUNTER — APPOINTMENT (OUTPATIENT)
Dept: INTERNAL MEDICINE | Facility: CLINIC | Age: 35
End: 2025-02-28

## 2025-04-15 ENCOUNTER — NON-APPOINTMENT (OUTPATIENT)
Age: 35
End: 2025-04-15

## 2025-04-18 ENCOUNTER — TRANSCRIPTION ENCOUNTER (OUTPATIENT)
Age: 35
End: 2025-04-18

## 2025-04-22 ENCOUNTER — RX RENEWAL (OUTPATIENT)
Age: 35
End: 2025-04-22

## 2025-04-25 ENCOUNTER — APPOINTMENT (OUTPATIENT)
Dept: INTERNAL MEDICINE | Facility: CLINIC | Age: 35
End: 2025-04-25

## 2025-05-23 ENCOUNTER — TRANSCRIPTION ENCOUNTER (OUTPATIENT)
Age: 35
End: 2025-05-23

## 2025-06-11 ENCOUNTER — APPOINTMENT (OUTPATIENT)
Dept: INTERNAL MEDICINE | Facility: CLINIC | Age: 35
End: 2025-06-11

## 2025-06-27 ENCOUNTER — RX RENEWAL (OUTPATIENT)
Age: 35
End: 2025-06-27

## 2025-07-02 ENCOUNTER — TRANSCRIPTION ENCOUNTER (OUTPATIENT)
Age: 35
End: 2025-07-02

## 2025-07-03 ENCOUNTER — APPOINTMENT (OUTPATIENT)
Dept: INTERNAL MEDICINE | Facility: CLINIC | Age: 35
End: 2025-07-03
Payer: COMMERCIAL

## 2025-07-03 PROCEDURE — 99213 OFFICE O/P EST LOW 20 MIN: CPT | Mod: 95

## 2025-07-07 PROBLEM — R19.7 DIARRHEA: Status: ACTIVE | Noted: 2025-07-03

## 2025-07-11 LAB
DEPRECATED O AND P PREP STL: NORMAL
GI PCR PANEL: DETECTED
SAPOVIRUS (GENOGROUPS I, II, IV, AND V): DETECTED

## 2025-07-12 LAB
C DIFF TOXIN B QL PCR REFLEX: NORMAL
GDH ANTIGEN: NOT DETECTED
TOXIN A AND B: NOT DETECTED

## 2025-07-15 ENCOUNTER — TRANSCRIPTION ENCOUNTER (OUTPATIENT)
Age: 35
End: 2025-07-15

## 2025-08-05 ENCOUNTER — TRANSCRIPTION ENCOUNTER (OUTPATIENT)
Age: 35
End: 2025-08-05

## 2025-08-14 ENCOUNTER — RX RENEWAL (OUTPATIENT)
Age: 35
End: 2025-08-14

## 2025-08-19 ENCOUNTER — TRANSCRIPTION ENCOUNTER (OUTPATIENT)
Age: 35
End: 2025-08-19

## 2025-08-20 ENCOUNTER — TRANSCRIPTION ENCOUNTER (OUTPATIENT)
Age: 35
End: 2025-08-20